# Patient Record
Sex: FEMALE | Race: WHITE | Employment: FULL TIME | URBAN - METROPOLITAN AREA
[De-identification: names, ages, dates, MRNs, and addresses within clinical notes are randomized per-mention and may not be internally consistent; named-entity substitution may affect disease eponyms.]

---

## 2020-06-10 ENCOUNTER — OFFICE VISIT (OUTPATIENT)
Dept: URGENT CARE | Facility: URGENT CARE | Age: 30
End: 2020-06-10
Payer: OTHER MISCELLANEOUS

## 2020-06-10 VITALS
SYSTOLIC BLOOD PRESSURE: 122 MMHG | DIASTOLIC BLOOD PRESSURE: 60 MMHG | WEIGHT: 160 LBS | HEIGHT: 64 IN | OXYGEN SATURATION: 100 % | TEMPERATURE: 99.3 F | RESPIRATION RATE: 20 BRPM | BODY MASS INDEX: 27.31 KG/M2 | HEART RATE: 72 BPM

## 2020-06-10 DIAGNOSIS — S09.90XA INJURY OF HEAD, INITIAL ENCOUNTER: Primary | ICD-10-CM

## 2020-06-10 PROCEDURE — 99203 OFFICE O/P NEW LOW 30 MIN: CPT | Performed by: PHYSICIAN ASSISTANT

## 2020-06-10 RX ORDER — ALPRAZOLAM 0.5 MG
0.5 TABLET ORAL
COMMUNITY
Start: 2018-10-29

## 2020-06-10 RX ORDER — FEXOFENADINE HCL 180 MG/1
180 TABLET ORAL
COMMUNITY

## 2020-06-10 ASSESSMENT — MIFFLIN-ST. JEOR: SCORE: 1430.76

## 2020-06-10 NOTE — PATIENT INSTRUCTIONS
Patient Education     After a Concussion     Awaken to check alertness as often as the health care provider suggests.     If you had a mild concussion (a head injury), watch closely for signs of problems during the first 48 hours after the injury. Follow the doctor s advice about recovering at home. Use the tips on this handout as a guide.  Note: You should not be left alone after a concussion. If no adult can stay with the injured person, let the doctor know.  Have someone call 911 or your emergency number if you can't fully wake up or have a seizures or convulsions.  The first 48 hours  Don t take medicine unless approved by your healthcare provider. Try placing a cold, damp cloth on your head to help relieve a headache.    Ask the doctor before using any medicines.    Don't drink alcohol or take sedatives or medicines that make you sleepy.    Don't return to sports or any activity that could cause you to hit your head until all symptoms are gone and you have been cleared by your doctor. A second head injury before fully recovering from the first one can lead to serious brain injury.    Don't do activities that need a lot of concentration or a lot of attention. This will allow your brain to rest and heal more quickly.    Return to regular physical and mental activity as directed and approved by your healthcare provider.  Tips about sleeping  For the first day or two, it may be best not to sleep for long periods of time without being checked for alertness. Follow the doctor s instructions.  ? Have someone wake you every ____ hours for the next ____ hours. He or she should ask you questions to check for alertness.  ? OK to sleep through the night.  When to call the healthcare provider  If you notice any of the following, call the healthcare provider:    Vomiting. Some vomiting is common, but tell the provider about any vomiting.    Clear or bloody drainage from the nose or ear    Constant drowsiness or difficulty  in waking up    Confusion or memory loss    Blurred vision or any vision changes    Inability to walk or talk normally    Increased weakness or problems with coordination    Constant, unrelieved headache that becomes more severe    Changes in behavior or personality    High-pitched crying in infants    Signs of stroke such as paralysis of parts of the body    Uncrontrolled movements suggesting a seizure  Date Last Reviewed: 12/1/2017 2000-2019 [employer]. [address] [place] [address]. All rights reserved. This information is not intended as a substitute for professional medical care. Always follow your healthcare professional's instructions.           Patient Education     Coping with Concussion  Concussion is also known as mild traumatic brain injury (MTBI). It is often caused by a blow to the head, or a fall. You may have been unconscious for a few seconds or minutes after the injury. Or maybe you were dazed, confused, or  saw stars.  After this, you thought you were OK. Now, weeks or months later, you re having symptoms that may be caused by a concussion. The good news is that, in most people,  these symptoms will likely go away on their own. Most people with a concussion recover fully, with no need for treatment.     A cold compress can help relieve a headache.    What is a concussion?  A concussion is a mild form of brain injury. In some cases, the effects of a concussion go away within days of the injury. In others, symptoms may continue for a few months. Fortunately, a concussion is temporary. Even when symptoms stay for months, they do go away over time. If they don't, or if your symptoms are worse, contact your healthcare provider.  Symptoms of a concussion  You may have noticed some of these symptoms:    Headaches    Irritability and other changes in behavior    Problems remembering or concentrating    Dizziness or lack of coordination    Fatigue    Problems  sleeping    Sensitivity to light and sound    Vision changes  NOTE: If you have severe symptoms or trouble functioning, talk with your healthcare provider right away. If you had a more serious head injury than a concussion, you likely need treatment. Be sure to see your healthcare provider for an evaluation.  What you can do  Since the effects of a concussion go away over time, there isn t a lot you need to do. Be assured that this problem is temporary. You ll likely have a full recovery. In the meantime, talk with your healthcare provider about ways to relieve any symptoms that are bothering you. These tips may help:    Don't return to sports or any activity that could cause you to hit your head until all symptoms are gone and you have been cleared by your doctor. A second head injury before fully recovering from the first one can lead to serious brain injury.    Return to normal activities of daily living and normal social interaction is encouraged to speed recovery.    Stress can make symptoms worse. Help calm yourself by resting in a quiet place and imagining a peaceful scene. Relax your muscles by soaking in a hot bath or taking a hot shower.    Take over-the-counter  acetaminophen to relieve headache pain. Take them as directed on the package. Don't take ibuprofen or aspirin after a head injury.    If you become dizzy, sit or lie down in a safe place until the sensation passes. Don t drive when you feel dizzy or disoriented.    If you re having trouble sleeping, try to keep a regular sleep schedule. Go to bed and get up at the same time each day. Avoid or limit caffeine and nicotine. Also don't drink alcohol. It may help you sleep at first, but your sleep will not be restful.    Give yourself time to heal. Your recovery will take some time. When you have symptoms, remember that you won t feel this way forever. In time the symptoms will go away and you ll be back to yourself.  If you re not feeling better  The  effects of a concussion often go away in 7 to 10 days and the vast majority of people who have had a concussion have recovered after 3 months. If you re not feeling better as time passes, there may be something else going on. If your symptoms don t go away or you notice new ones, talk with your healthcare provider. He or she can help you get the treatment you need.  Date Last Reviewed: 1/1/2018 2000-2019 [employer]. [address] [place] [address], [place] [address]. All rights reserved. This information is not intended as a substitute for professional medical care. Always follow your healthcare professional's instructions.

## 2020-06-10 NOTE — PROGRESS NOTES
SUBJECTIVE:  Vangie is a 30 year old female who presents to urgent care after hitting her head on the cabinet earlier today.  At work she stood up suddenly and hit the top of her head on a wooden cabinet.  She did not lose consciousness.  She has had a headache since then.  She denies a history of TBI.  She denies any changes in mood, retrograde amnesia, confusion, changes in mentation, nausea, vomiting, vision changes, tinnitus, lightheadedness, dizziness, gait disturbances, balance issues, incoordination, other joint or muscle aches.    Chief Complaint   Patient presents with     Urgent Care     Head Injury     hit her head on the cuboard and had a headache ever since     ROS: S HPI    Current Outpatient Medications   Medication     Acetaminophen (TYLENOL PO)     ALPRAZolam (XANAX) 0.5 MG tablet     fexofenadine (ALLEGRA) 180 MG tablet     No current facility-administered medications for this visit.       Patient Active Problem List   Diagnosis     CARDIOVASCULAR SCREENING; LDL GOAL LESS THAN 160     Family history of melanoma     IBS (irritable bowel syndrome)     Dysmenorrhea     HDL deficiency     Lactose intolerance     Microscopic hematuria        Past Medical History:   Diagnosis Date     BMI 30.0-30.9,adult      HDL deficiency 5/29/2012     IBS (irritable bowel syndrome)     ho GI eval.      Lactose intolerance 8/9/12 milk skin test negative.     Past Surgical History:   Procedure Laterality Date     TONSILLECTOMY, ADENOIDECTOMY, COMBINED  1992     Family History   Problem Relation Age of Onset     Lipids Paternal Grandmother      Cancer Father         Melanoma     Cancer - colorectal Paternal Grandfather      Asthma No family hx of      Hypertension No family hx of      Diabetes Maternal Uncle      Genitourinary Problems Father         kidney stones     Social History     Tobacco Use     Smoking status: Never Smoker     Smokeless tobacco: Never Used   Substance Use Topics     Alcohol use: No     "    OBJECTIVE:  /60 (BP Location: Right arm, Patient Position: Sitting, Cuff Size: Adult Regular)   Pulse 72   Temp 99.3  F (37.4  C) (Tympanic)   Resp 20   Ht 1.626 m (5' 4\")   Wt 72.6 kg (160 lb)   SpO2 100%   BMI 27.46 kg/m       GENERAL APPEARANCE: healthy, alert and no distress     EYES: EOMI, - PERRL     HENT: ear canals and TM's normal, no rhinorrhea NCAT, no breaks in skin on scalp, no lumps or bruises seen     NECK: Nontender, no midline tenderness, full range of motion, supple     RESP: No respiratory distress     MS: extremities normal- no gross deformities noted, no evidence of inflammation in joints, FROM in all extremities.     SKIN: no suspicious lesions or rashes     NEURO: Cranial nerves II through XII intact, finger-to-nose normal, no nystagmus, RAHM normal, Romberg negative, upper and lower extremity strength 5/5 and symmetric,      PSYCH: mentation appears normal. and affect normal/bright    ASSESSMENT/PLAN:  Vangie was seen today for urgent care and head injury.    Diagnoses and all orders for this visit:    Injury of head, initial encounter      Reassuring exam with no neurologic abnormality noted.  Patient has a headache but otherwise no symptoms.  Discussed TBI and concussion protocol.  Discussed expected healing and brain rest.  Return to work/activity precautions discussed.  Head injury info printed and given to patient.  Discussed she can use ice, Tylenol and ibuprofen.  Had a longer discussion about ibuprofen since it can thin the blood but given her age and presentation at time of visit it seems highly unlikely for patient to have an intracranial process such as a subdural hematoma or other brain bleed which we discussed as a low possibility.  Discussed signs and symptoms that would warrant immediate evaluation by medical provider or follow-up    The plan of care was discussed with the patient. They understand and agree with the course of treatment prescribed. A printed " summary was given including instructions and medications.    Rolando Montanez PA-C